# Patient Record
Sex: FEMALE | Race: WHITE | URBAN - METROPOLITAN AREA
[De-identification: names, ages, dates, MRNs, and addresses within clinical notes are randomized per-mention and may not be internally consistent; named-entity substitution may affect disease eponyms.]

---

## 2020-07-14 ENCOUNTER — CONSULT (OUTPATIENT)
Dept: URBAN - METROPOLITAN AREA CLINIC 24 | Facility: CLINIC | Age: 65
End: 2020-07-14
Payer: COMMERCIAL

## 2020-07-14 PROCEDURE — 92250 FUNDUS PHOTOGRAPHY W/I&R: CPT | Performed by: OPHTHALMOLOGY

## 2020-07-14 PROCEDURE — 92133 CPTRZD OPH DX IMG PST SGM ON: CPT | Performed by: OPHTHALMOLOGY

## 2020-07-14 PROCEDURE — 92083 EXTENDED VISUAL FIELD XM: CPT | Performed by: OPHTHALMOLOGY

## 2020-07-14 PROCEDURE — 92004 COMPRE OPH EXAM NEW PT 1/>: CPT | Performed by: OPHTHALMOLOGY

## 2020-07-14 PROCEDURE — 76514 ECHO EXAM OF EYE THICKNESS: CPT | Performed by: OPHTHALMOLOGY

## 2020-07-14 PROCEDURE — 92020 GONIOSCOPY: CPT | Performed by: OPHTHALMOLOGY

## 2020-07-14 RX ORDER — LATANOPROST 50 UG/ML
0.005 % SOLUTION OPHTHALMIC
Qty: 3 | Refills: 3 | Status: INACTIVE
Start: 2020-07-14 | End: 2021-11-08

## 2020-07-14 ASSESSMENT — INTRAOCULAR PRESSURE
OS: 30
OD: 32

## 2020-08-04 ENCOUNTER — FOLLOW UP ESTABLISHED (OUTPATIENT)
Dept: URBAN - METROPOLITAN AREA CLINIC 24 | Facility: CLINIC | Age: 65
End: 2020-08-04
Payer: COMMERCIAL

## 2020-08-04 DIAGNOSIS — H40.033 ANATOMICAL NARROW ANGLE, BILATERAL: Primary | ICD-10-CM

## 2020-08-04 PROCEDURE — 92012 INTRM OPH EXAM EST PATIENT: CPT | Performed by: OPHTHALMOLOGY

## 2020-08-04 ASSESSMENT — INTRAOCULAR PRESSURE
OD: 17
OS: 15

## 2021-02-09 ENCOUNTER — FOLLOW UP ESTABLISHED (OUTPATIENT)
Dept: URBAN - METROPOLITAN AREA CLINIC 24 | Facility: CLINIC | Age: 66
End: 2021-02-09
Payer: MEDICARE

## 2021-02-09 PROCEDURE — 99213 OFFICE O/P EST LOW 20 MIN: CPT | Performed by: OPHTHALMOLOGY

## 2021-02-09 ASSESSMENT — INTRAOCULAR PRESSURE
OD: 16
OS: 14

## 2022-03-18 ENCOUNTER — OFFICE VISIT (OUTPATIENT)
Dept: URBAN - METROPOLITAN AREA CLINIC 24 | Facility: CLINIC | Age: 67
End: 2022-03-18
Payer: MEDICARE

## 2022-03-18 PROCEDURE — 92133 CPTRZD OPH DX IMG PST SGM ON: CPT | Performed by: OPHTHALMOLOGY

## 2022-03-18 PROCEDURE — 99214 OFFICE O/P EST MOD 30 MIN: CPT | Performed by: OPHTHALMOLOGY

## 2022-03-18 PROCEDURE — 92083 EXTENDED VISUAL FIELD XM: CPT | Performed by: OPHTHALMOLOGY

## 2022-03-18 RX ORDER — LATANOPROST 50 UG/ML
0.005 % SOLUTION OPHTHALMIC
Qty: 7.5 | Refills: 3 | Status: ACTIVE
Start: 2022-03-18

## 2022-03-18 ASSESSMENT — INTRAOCULAR PRESSURE
OD: 15
OS: 15

## 2022-03-18 NOTE — IMPRESSION/PLAN
Impression: Anatomical narrow angle, bilateral Plan: PT HAS NAG  AND ADVANCED LTG OU WITH PROFOUND VF LOSS MORE DAMAGE OS THAN OD  
GONIO : ANT TM OU (20)     PACHS:  [id number] PT WAS REFERRED BY DR. Elda NORIEGA OD 
THE VF ON PRESENTATION HAS SEVERE INF VF LOSS WITH FIXATION INVOLVMENT OS, AND PARACENTRAL LOSS OD
THE DISC OD PRESENTED WITH A DRANCE HEMORRHAGE
+ FAM HX OF GLC (MOTHER USED DROPS- ) PT REPORTS SULFA ALLERGY 
PT DENIES LUNG DZ 
TARGET IOP LOW TEENS OR LESS 
RECOMMEND : 
1. CONTINUE LATANOPROST OU QHS ( 20) 2. RECOMMEND LPI  TO AVOID ANGLE CLOSURE. DISCUSSED RISKS VS BENEFITS OF LPI. 3. PT DEFERS LPI OU 20 - 2022 4. ON 20 THE PT WAS GIVEN WRITTEN LITERATURE ON THE NATURE OF ANGLE CLOSURE 5. THE PT IS AWARE THAT THEY MAY REQUIRE LONG-TERM USE OF EYEDROP MEDICATIONS EVEN AFTER SUCCESSFUL LPI 6.  F/U IOP CHECK 6-12 MONTHS OR SOONER IF PT WISHES TO UNDERGO LPI